# Patient Record
Sex: FEMALE | Race: WHITE | Employment: FULL TIME | URBAN - METROPOLITAN AREA
[De-identification: names, ages, dates, MRNs, and addresses within clinical notes are randomized per-mention and may not be internally consistent; named-entity substitution may affect disease eponyms.]

---

## 2017-03-09 ENCOUNTER — HOSPITAL ENCOUNTER (OUTPATIENT)
Dept: GENERAL RADIOLOGY | Age: 52
Discharge: HOME | End: 2017-03-09
Attending: FAMILY MEDICINE | Admitting: FAMILY MEDICINE

## 2017-03-09 ENCOUNTER — OFFICE VISIT (OUTPATIENT)
Dept: FAMILY MEDICINE CLINIC | Age: 52
End: 2017-03-09

## 2017-03-09 VITALS
WEIGHT: 133.6 LBS | DIASTOLIC BLOOD PRESSURE: 64 MMHG | OXYGEN SATURATION: 97 % | HEART RATE: 69 BPM | BODY MASS INDEX: 20.92 KG/M2 | SYSTOLIC BLOOD PRESSURE: 102 MMHG

## 2017-03-09 DIAGNOSIS — M77.11 EPICONDYLITIS, LATERAL, RIGHT: ICD-10-CM

## 2017-03-09 DIAGNOSIS — M77.01 EPICONDYLITIS ELBOW, MEDIAL, RIGHT: Primary | ICD-10-CM

## 2017-03-09 DIAGNOSIS — M77.01 EPICONDYLITIS ELBOW, MEDIAL, RIGHT: ICD-10-CM

## 2017-03-09 PROCEDURE — 99213 OFFICE O/P EST LOW 20 MIN: CPT | Performed by: FAMILY MEDICINE

## 2017-03-09 RX ORDER — TRIAMCINOLONE ACETONIDE 1 MG/G
CREAM TOPICAL
Refills: 1 | COMMUNITY
Start: 2016-12-22

## 2017-03-09 RX ORDER — METHYLPREDNISOLONE 4 MG/1
TABLET ORAL
Qty: 1 KIT | Refills: 0 | Status: SHIPPED | OUTPATIENT
Start: 2017-03-09 | End: 2018-04-19

## 2017-03-09 ASSESSMENT — ENCOUNTER SYMPTOMS
ABDOMINAL PAIN: 0
SHORTNESS OF BREATH: 0
SORE THROAT: 0
BACK PAIN: 0
EYE DISCHARGE: 0
COUGH: 0
SINUS PRESSURE: 0

## 2017-03-09 ASSESSMENT — PATIENT HEALTH QUESTIONNAIRE - PHQ9
1. LITTLE INTEREST OR PLEASURE IN DOING THINGS: 0
SUM OF ALL RESPONSES TO PHQ QUESTIONS 1-9: 0
SUM OF ALL RESPONSES TO PHQ9 QUESTIONS 1 & 2: 0
2. FEELING DOWN, DEPRESSED OR HOPELESS: 0

## 2017-03-13 ENCOUNTER — TELEPHONE (OUTPATIENT)
Dept: FAMILY MEDICINE CLINIC | Age: 52
End: 2017-03-13

## 2017-04-03 ENCOUNTER — TELEPHONE (OUTPATIENT)
Dept: INTERNAL MEDICINE CLINIC | Age: 52
End: 2017-04-03

## 2017-04-04 ENCOUNTER — OFFICE VISIT (OUTPATIENT)
Dept: INTERNAL MEDICINE CLINIC | Age: 52
End: 2017-04-04

## 2017-04-04 VITALS
HEIGHT: 69 IN | RESPIRATION RATE: 12 BRPM | SYSTOLIC BLOOD PRESSURE: 110 MMHG | HEART RATE: 64 BPM | DIASTOLIC BLOOD PRESSURE: 64 MMHG | WEIGHT: 132.8 LBS | BODY MASS INDEX: 19.67 KG/M2

## 2017-04-04 DIAGNOSIS — E55.9 VITAMIN D DEFICIENCY: ICD-10-CM

## 2017-04-04 DIAGNOSIS — R00.2 PALPITATIONS: ICD-10-CM

## 2017-04-04 DIAGNOSIS — E04.2 MULTINODULAR GOITER: ICD-10-CM

## 2017-04-04 DIAGNOSIS — Z00.00 ANNUAL PHYSICAL EXAM: Primary | ICD-10-CM

## 2017-04-04 LAB
HEMOCCULT STL QL: NORMAL

## 2017-04-04 PROCEDURE — 93000 ELECTROCARDIOGRAM COMPLETE: CPT | Performed by: INTERNAL MEDICINE

## 2017-04-04 PROCEDURE — 81001 URINALYSIS AUTO W/SCOPE: CPT | Performed by: INTERNAL MEDICINE

## 2017-04-04 PROCEDURE — 99396 PREV VISIT EST AGE 40-64: CPT | Performed by: INTERNAL MEDICINE

## 2017-04-04 PROCEDURE — 36415 COLL VENOUS BLD VENIPUNCTURE: CPT | Performed by: INTERNAL MEDICINE

## 2017-04-04 RX ORDER — CETIRIZINE HYDROCHLORIDE 10 MG/1
10 TABLET ORAL
COMMUNITY

## 2017-04-05 LAB
A/G RATIO: 2.3 (ref 1.1–2.2)
ALBUMIN SERPL-MCNC: 4.4 G/DL (ref 3.4–5)
ALP BLD-CCNC: 61 U/L (ref 40–129)
ALT SERPL-CCNC: 15 U/L (ref 10–40)
ANION GAP SERPL CALCULATED.3IONS-SCNC: 15 MMOL/L (ref 3–16)
AST SERPL-CCNC: 20 U/L (ref 15–37)
BASOPHILS ABSOLUTE: 0 K/UL (ref 0–0.2)
BASOPHILS RELATIVE PERCENT: 0.6 %
BILIRUB SERPL-MCNC: 0.7 MG/DL (ref 0–1)
BILIRUBIN URINE: NEGATIVE
BLOOD, URINE: NEGATIVE
BUN BLDV-MCNC: 18 MG/DL (ref 7–20)
CA 125: 10.4 U/ML (ref 0–35)
CALCIUM SERPL-MCNC: 8.9 MG/DL (ref 8.3–10.6)
CHLORIDE BLD-SCNC: 103 MMOL/L (ref 99–110)
CHOLESTEROL, TOTAL: 158 MG/DL (ref 0–199)
CLARITY: CLEAR
CO2: 25 MMOL/L (ref 21–32)
COLOR: YELLOW
CREAT SERPL-MCNC: 0.7 MG/DL (ref 0.6–1.1)
EOSINOPHILS ABSOLUTE: 0.2 K/UL (ref 0–0.6)
EOSINOPHILS RELATIVE PERCENT: 7.4 %
EPITHELIAL CELLS, UA: 0 /HPF (ref 0–5)
GFR AFRICAN AMERICAN: >60
GFR NON-AFRICAN AMERICAN: >60
GLOBULIN: 1.9 G/DL
GLUCOSE BLD-MCNC: 71 MG/DL (ref 70–99)
GLUCOSE URINE: NEGATIVE MG/DL
HCT VFR BLD CALC: 42.6 % (ref 36–48)
HDLC SERPL-MCNC: 76 MG/DL (ref 40–60)
HEMOGLOBIN: 13.8 G/DL (ref 12–16)
HYALINE CASTS: 0 /HPF (ref 0–8)
KETONES, URINE: NEGATIVE MG/DL
LDL CHOLESTEROL CALCULATED: 73 MG/DL
LEUKOCYTE ESTERASE, URINE: ABNORMAL
LYMPHOCYTES ABSOLUTE: 0.7 K/UL (ref 1–5.1)
LYMPHOCYTES RELATIVE PERCENT: 25.1 %
MCH RBC QN AUTO: 30.2 PG (ref 26–34)
MCHC RBC AUTO-ENTMCNC: 32.4 G/DL (ref 31–36)
MCV RBC AUTO: 93 FL (ref 80–100)
MICROSCOPIC EXAMINATION: YES
MONOCYTES ABSOLUTE: 0.2 K/UL (ref 0–1.3)
MONOCYTES RELATIVE PERCENT: 6.6 %
NEUTROPHILS ABSOLUTE: 1.8 K/UL (ref 1.7–7.7)
NEUTROPHILS RELATIVE PERCENT: 60.3 %
NITRITE, URINE: NEGATIVE
PDW BLD-RTO: 13 % (ref 12.4–15.4)
PH UA: 6.5
PLATELET # BLD: 127 K/UL (ref 135–450)
PMV BLD AUTO: 10.6 FL (ref 5–10.5)
POTASSIUM SERPL-SCNC: 3.9 MMOL/L (ref 3.5–5.1)
PROTEIN UA: NEGATIVE MG/DL
RBC # BLD: 4.58 M/UL (ref 4–5.2)
RBC UA: 1 /HPF (ref 0–4)
SODIUM BLD-SCNC: 143 MMOL/L (ref 136–145)
SPECIFIC GRAVITY UA: 1.01
TOTAL PROTEIN: 6.3 G/DL (ref 6.4–8.2)
TRIGL SERPL-MCNC: 43 MG/DL (ref 0–150)
TSH SERPL DL<=0.05 MIU/L-ACNC: 0.6 UIU/ML (ref 0.27–4.2)
UROBILINOGEN, URINE: 0.2 E.U./DL
VITAMIN D 25-HYDROXY: 33.8 NG/ML
VLDLC SERPL CALC-MCNC: 9 MG/DL
WBC # BLD: 2.9 K/UL (ref 4–11)
WBC UA: 0 /HPF (ref 0–5)

## 2017-04-11 ENCOUNTER — TELEPHONE (OUTPATIENT)
Dept: INTERNAL MEDICINE CLINIC | Age: 52
End: 2017-04-11

## 2017-04-17 ENCOUNTER — TELEPHONE (OUTPATIENT)
Dept: INTERNAL MEDICINE CLINIC | Age: 52
End: 2017-04-17

## 2017-04-18 ENCOUNTER — OFFICE VISIT (OUTPATIENT)
Dept: INTERNAL MEDICINE CLINIC | Age: 52
End: 2017-04-18

## 2017-04-18 DIAGNOSIS — E04.2 MULTINODULAR GOITER: ICD-10-CM

## 2017-04-18 DIAGNOSIS — Z23 NEED FOR TDAP VACCINATION: ICD-10-CM

## 2017-04-18 DIAGNOSIS — D69.6 THROMBOCYTOPENIA (HCC): Primary | ICD-10-CM

## 2017-04-18 DIAGNOSIS — J45.40 MODERATE PERSISTENT ASTHMA WITHOUT COMPLICATION: ICD-10-CM

## 2017-04-18 DIAGNOSIS — J30.2 OTHER SEASONAL ALLERGIC RHINITIS: ICD-10-CM

## 2017-04-18 LAB
BASOPHILS ABSOLUTE: 0 K/UL (ref 0–0.2)
BASOPHILS RELATIVE PERCENT: 0.1 %
EOSINOPHILS ABSOLUTE: 0.7 K/UL (ref 0–0.6)
EOSINOPHILS RELATIVE PERCENT: 12 %
HCT VFR BLD CALC: 42.6 % (ref 36–48)
HEMOGLOBIN: 14.1 G/DL (ref 12–16)
LYMPHOCYTES ABSOLUTE: 1.4 K/UL (ref 1–5.1)
LYMPHOCYTES RELATIVE PERCENT: 25.3 %
MCH RBC QN AUTO: 30.2 PG (ref 26–34)
MCHC RBC AUTO-ENTMCNC: 33.1 G/DL (ref 31–36)
MCV RBC AUTO: 91.2 FL (ref 80–100)
MONOCYTES ABSOLUTE: 0.4 K/UL (ref 0–1.3)
MONOCYTES RELATIVE PERCENT: 6.4 %
NEUTROPHILS ABSOLUTE: 3.2 K/UL (ref 1.7–7.7)
NEUTROPHILS RELATIVE PERCENT: 56.2 %
PDW BLD-RTO: 12.6 % (ref 12.4–15.4)
PLATELET # BLD: 146 K/UL (ref 135–450)
PMV BLD AUTO: 10.9 FL (ref 5–10.5)
RBC # BLD: 4.67 M/UL (ref 4–5.2)
WBC # BLD: 5.6 K/UL (ref 4–11)

## 2017-04-18 PROCEDURE — 36415 COLL VENOUS BLD VENIPUNCTURE: CPT | Performed by: INTERNAL MEDICINE

## 2017-04-18 PROCEDURE — 99213 OFFICE O/P EST LOW 20 MIN: CPT | Performed by: INTERNAL MEDICINE

## 2017-04-19 PROCEDURE — 90715 TDAP VACCINE 7 YRS/> IM: CPT | Performed by: INTERNAL MEDICINE

## 2017-04-19 PROCEDURE — 90471 IMMUNIZATION ADMIN: CPT | Performed by: INTERNAL MEDICINE

## 2017-05-02 VITALS — SYSTOLIC BLOOD PRESSURE: 106 MMHG | DIASTOLIC BLOOD PRESSURE: 66 MMHG | HEART RATE: 60 BPM | OXYGEN SATURATION: 96 %

## 2017-07-14 RX ORDER — MONTELUKAST SODIUM 10 MG/1
TABLET ORAL
Qty: 90 TABLET | Refills: 3 | Status: SHIPPED | OUTPATIENT
Start: 2017-07-14 | End: 2018-07-09 | Stop reason: SDUPTHER

## 2017-08-16 ENCOUNTER — NURSE ONLY (OUTPATIENT)
Dept: INTERNAL MEDICINE CLINIC | Age: 52
End: 2017-08-16

## 2017-08-16 ENCOUNTER — TELEPHONE (OUTPATIENT)
Dept: INTERNAL MEDICINE CLINIC | Age: 52
End: 2017-08-16

## 2017-08-16 DIAGNOSIS — Z91.09 ENVIRONMENTAL ALLERGIES: Primary | ICD-10-CM

## 2017-08-16 PROCEDURE — 96372 THER/PROPH/DIAG INJ SC/IM: CPT | Performed by: INTERNAL MEDICINE

## 2017-08-16 RX ORDER — TRIAMCINOLONE ACETONIDE 40 MG/ML
40 INJECTION, SUSPENSION INTRA-ARTICULAR; INTRAMUSCULAR ONCE
Status: COMPLETED | OUTPATIENT
Start: 2017-08-16 | End: 2017-08-16

## 2017-08-16 RX ADMIN — TRIAMCINOLONE ACETONIDE 40 MG: 40 INJECTION, SUSPENSION INTRA-ARTICULAR; INTRAMUSCULAR at 11:46

## 2018-01-04 ENCOUNTER — TELEPHONE (OUTPATIENT)
Dept: INTERNAL MEDICINE CLINIC | Age: 53
End: 2018-01-04

## 2018-04-19 ENCOUNTER — OFFICE VISIT (OUTPATIENT)
Dept: INTERNAL MEDICINE CLINIC | Age: 53
End: 2018-04-19

## 2018-04-19 VITALS
SYSTOLIC BLOOD PRESSURE: 110 MMHG | HEART RATE: 76 BPM | DIASTOLIC BLOOD PRESSURE: 60 MMHG | HEIGHT: 64 IN | BODY MASS INDEX: 22.71 KG/M2 | WEIGHT: 133 LBS

## 2018-04-19 DIAGNOSIS — E55.9 VITAMIN D DEFICIENCY: ICD-10-CM

## 2018-04-19 DIAGNOSIS — E04.2 MULTINODULAR THYROID: ICD-10-CM

## 2018-04-19 DIAGNOSIS — Z00.00 ANNUAL PHYSICAL EXAM: Primary | ICD-10-CM

## 2018-04-19 DIAGNOSIS — K57.30 SIGMOID DIVERTICULOSIS: ICD-10-CM

## 2018-04-19 DIAGNOSIS — E06.3 HASHIMOTO'S THYROIDITIS: ICD-10-CM

## 2018-04-19 LAB
A/G RATIO: 1.8 (ref 1.1–2.2)
ALBUMIN SERPL-MCNC: 4.3 G/DL (ref 3.4–5)
ALP BLD-CCNC: 68 U/L (ref 40–129)
ALT SERPL-CCNC: 14 U/L (ref 10–40)
ANION GAP SERPL CALCULATED.3IONS-SCNC: 13 MMOL/L (ref 3–16)
ANTI-THYROGLOB ABS: 11 IU/ML
AST SERPL-CCNC: 21 U/L (ref 15–37)
BASOPHILS ABSOLUTE: 0 K/UL (ref 0–0.2)
BASOPHILS RELATIVE PERCENT: 0.3 %
BILIRUB SERPL-MCNC: 0.4 MG/DL (ref 0–1)
BILIRUBIN URINE: NEGATIVE
BLOOD, URINE: NEGATIVE
BUN BLDV-MCNC: 15 MG/DL (ref 7–20)
CA 125: 12.5 U/ML (ref 0–35)
CALCIUM SERPL-MCNC: 9.6 MG/DL (ref 8.3–10.6)
CHLORIDE BLD-SCNC: 102 MMOL/L (ref 99–110)
CHOLESTEROL, TOTAL: 166 MG/DL (ref 0–199)
CLARITY: CLEAR
CO2: 28 MMOL/L (ref 21–32)
COLOR: YELLOW
CREAT SERPL-MCNC: 0.7 MG/DL (ref 0.6–1.1)
EOSINOPHILS ABSOLUTE: 0.3 K/UL (ref 0–0.6)
EOSINOPHILS RELATIVE PERCENT: 6.9 %
EPITHELIAL CELLS, UA: 0 /HPF (ref 0–5)
GFR AFRICAN AMERICAN: >60
GFR NON-AFRICAN AMERICAN: >60
GLOBULIN: 2.4 G/DL
GLUCOSE BLD-MCNC: 81 MG/DL (ref 70–99)
GLUCOSE URINE: NEGATIVE MG/DL
HCT VFR BLD CALC: 43.4 % (ref 36–48)
HDLC SERPL-MCNC: 90 MG/DL (ref 40–60)
HEMOGLOBIN: 14.8 G/DL (ref 12–16)
HYALINE CASTS: 0 /LPF (ref 0–8)
KETONES, URINE: NEGATIVE MG/DL
LDL CHOLESTEROL CALCULATED: 67 MG/DL
LEUKOCYTE ESTERASE, URINE: NEGATIVE
LYMPHOCYTES ABSOLUTE: 0.9 K/UL (ref 1–5.1)
LYMPHOCYTES RELATIVE PERCENT: 24.5 %
MCH RBC QN AUTO: 30.7 PG (ref 26–34)
MCHC RBC AUTO-ENTMCNC: 34 G/DL (ref 31–36)
MCV RBC AUTO: 90.2 FL (ref 80–100)
MICROSCOPIC EXAMINATION: NORMAL
MONOCYTES ABSOLUTE: 0.3 K/UL (ref 0–1.3)
MONOCYTES RELATIVE PERCENT: 7 %
NEUTROPHILS ABSOLUTE: 2.3 K/UL (ref 1.7–7.7)
NEUTROPHILS RELATIVE PERCENT: 61.3 %
NITRITE, URINE: NEGATIVE
PDW BLD-RTO: 12.9 % (ref 12.4–15.4)
PH UA: 6.5
PLATELET # BLD: 123 K/UL (ref 135–450)
PMV BLD AUTO: 11.3 FL (ref 5–10.5)
POTASSIUM SERPL-SCNC: 4.3 MMOL/L (ref 3.5–5.1)
PROTEIN UA: NEGATIVE MG/DL
RBC # BLD: 4.81 M/UL (ref 4–5.2)
RBC UA: 2 /HPF (ref 0–4)
SEDIMENTATION RATE, ERYTHROCYTE: 9 MM/HR (ref 0–30)
SODIUM BLD-SCNC: 143 MMOL/L (ref 136–145)
SPECIFIC GRAVITY UA: 1.01
THYROID PEROXIDASE (TPO) ABS: 10 IU/ML
TOTAL PROTEIN: 6.7 G/DL (ref 6.4–8.2)
TRIGL SERPL-MCNC: 47 MG/DL (ref 0–150)
TSH SERPL DL<=0.05 MIU/L-ACNC: 0.56 UIU/ML (ref 0.27–4.2)
UROBILINOGEN, URINE: 0.2 E.U./DL
VITAMIN D 25-HYDROXY: 45.4 NG/ML
VLDLC SERPL CALC-MCNC: 9 MG/DL
WBC # BLD: 3.7 K/UL (ref 4–11)
WBC UA: 0 /HPF (ref 0–5)

## 2018-04-19 PROCEDURE — 36415 COLL VENOUS BLD VENIPUNCTURE: CPT | Performed by: INTERNAL MEDICINE

## 2018-04-19 PROCEDURE — 81001 URINALYSIS AUTO W/SCOPE: CPT | Performed by: INTERNAL MEDICINE

## 2018-04-19 PROCEDURE — 99396 PREV VISIT EST AGE 40-64: CPT | Performed by: INTERNAL MEDICINE

## 2018-04-19 RX ORDER — FLUTICASONE PROPIONATE 50 MCG
1 SPRAY, SUSPENSION (ML) NASAL DAILY
COMMUNITY
End: 2019-05-03 | Stop reason: CLARIF

## 2018-04-19 ASSESSMENT — PATIENT HEALTH QUESTIONNAIRE - PHQ9
SUM OF ALL RESPONSES TO PHQ9 QUESTIONS 1 & 2: 0
SUM OF ALL RESPONSES TO PHQ QUESTIONS 1-9: 0
1. LITTLE INTEREST OR PLEASURE IN DOING THINGS: 0
2. FEELING DOWN, DEPRESSED OR HOPELESS: 0

## 2018-04-20 ENCOUNTER — HOSPITAL ENCOUNTER (OUTPATIENT)
Dept: ULTRASOUND IMAGING | Age: 53
Discharge: HOME | End: 2018-04-20
Attending: INTERNAL MEDICINE | Admitting: INTERNAL MEDICINE

## 2018-04-20 DIAGNOSIS — E04.2 MULTINODULAR THYROID: ICD-10-CM

## 2018-04-20 DIAGNOSIS — E04.2 NONTOXIC MULTINODULAR GOITER: ICD-10-CM

## 2018-04-20 LAB
ESTIMATED AVERAGE GLUCOSE: 99.7 MG/DL
HBA1C MFR BLD: 5.1 %

## 2018-05-02 PROBLEM — K57.30 SIGMOID DIVERTICULOSIS: Status: ACTIVE | Noted: 2018-05-02

## 2018-05-03 ENCOUNTER — OFFICE VISIT (OUTPATIENT)
Dept: INTERNAL MEDICINE CLINIC | Age: 53
End: 2018-05-03

## 2018-05-03 VITALS — DIASTOLIC BLOOD PRESSURE: 72 MMHG | SYSTOLIC BLOOD PRESSURE: 94 MMHG | RESPIRATION RATE: 16 BRPM | HEART RATE: 68 BPM

## 2018-05-03 DIAGNOSIS — E55.9 VITAMIN D DEFICIENCY: ICD-10-CM

## 2018-05-03 DIAGNOSIS — E04.2 MULTINODULAR THYROID: Primary | ICD-10-CM

## 2018-05-03 DIAGNOSIS — J45.40 MODERATE PERSISTENT ASTHMA WITHOUT COMPLICATION: ICD-10-CM

## 2018-05-03 DIAGNOSIS — D69.6 THROMBOCYTOPENIA (HCC): ICD-10-CM

## 2018-05-03 PROCEDURE — 99213 OFFICE O/P EST LOW 20 MIN: CPT | Performed by: INTERNAL MEDICINE

## 2018-05-07 ENCOUNTER — TELEPHONE (OUTPATIENT)
Dept: INTERNAL MEDICINE CLINIC | Age: 53
End: 2018-05-07

## 2018-06-05 ENCOUNTER — HOSPITAL ENCOUNTER (OUTPATIENT)
Dept: ULTRASOUND IMAGING | Age: 53
Discharge: HOME | End: 2018-06-05
Attending: OBSTETRICS & GYNECOLOGY | Admitting: OBSTETRICS & GYNECOLOGY

## 2018-06-05 DIAGNOSIS — R10.2 PELVIC AND PERINEAL PAIN: ICD-10-CM

## 2018-07-09 RX ORDER — MONTELUKAST SODIUM 10 MG/1
TABLET ORAL
Qty: 90 TABLET | Refills: 3 | Status: SHIPPED | OUTPATIENT
Start: 2018-07-09 | End: 2019-07-08 | Stop reason: SDUPTHER

## 2019-05-03 ENCOUNTER — OFFICE VISIT (OUTPATIENT)
Dept: INTERNAL MEDICINE CLINIC | Age: 54
End: 2019-05-03
Payer: COMMERCIAL

## 2019-05-03 VITALS
WEIGHT: 131 LBS | RESPIRATION RATE: 15 BRPM | HEART RATE: 68 BPM | BODY MASS INDEX: 19.85 KG/M2 | SYSTOLIC BLOOD PRESSURE: 122 MMHG | DIASTOLIC BLOOD PRESSURE: 74 MMHG | HEIGHT: 68 IN

## 2019-05-03 DIAGNOSIS — E55.9 VITAMIN D DEFICIENCY: ICD-10-CM

## 2019-05-03 DIAGNOSIS — Z23 NEED FOR PROPHYLACTIC VACCINATION AND INOCULATION AGAINST VARICELLA: ICD-10-CM

## 2019-05-03 DIAGNOSIS — Z00.00 WELLNESS EXAMINATION: Primary | ICD-10-CM

## 2019-05-03 PROCEDURE — 99396 PREV VISIT EST AGE 40-64: CPT | Performed by: INTERNAL MEDICINE

## 2019-05-03 ASSESSMENT — PATIENT HEALTH QUESTIONNAIRE - PHQ9
SUM OF ALL RESPONSES TO PHQ QUESTIONS 1-9: 0
SUM OF ALL RESPONSES TO PHQ9 QUESTIONS 1 & 2: 0
2. FEELING DOWN, DEPRESSED OR HOPELESS: 0
1. LITTLE INTEREST OR PLEASURE IN DOING THINGS: 0
SUM OF ALL RESPONSES TO PHQ QUESTIONS 1-9: 0

## 2019-05-03 NOTE — PATIENT INSTRUCTIONS
[employer]  Omega 3 Fish Oil 1200 mg one every other day    Hepatitis AB immunization at 0-1-6 months or Hepatitis A at 0 & 6 months

## 2019-05-03 NOTE — PROGRESS NOTES
Annual Exam      PI:         The patient is a 48year old white female who presents for her wellness exam. Doing well and using her medications as directed and without side effects. No fever or chills. No chest pain. She has been doing her normal activities without limitations or symptoms. She has a problem of asthma on Pulmicort Flexhaler and Singulair with good results. Recently her insurance required a change to Qvar inhaler with poor results. She sees Dr Camilla Galdamez for her pelvic and pap. She recently completed a normal exam and had a normal mammogram at [facility] 2 months ago. She has a history of multinodular thyroid. Left thyroid cystic lesion aspirated by Dr Lm Umana in 5/2010 and nondiagnostic. Her last thyroid US was on July 2016 showing stable cystic lesions. Thyroid ultrasound in 2017 was not completed. Her thyroid US in April 2018 revealed a multinodular thyroid with no change from 2016. She had a 2nd opinion at [facility] and underwent a FNA on 7/2/18 of a small right thyroid nodule by Dr Julissa Rosado revealing no malignant cells with benign follicular cells and abundant colloid. No neck pain, voice change or swallowing difficulties. Her Brother was treated for thyroid cancer. She completed a colonoscopy in Feb 2018 at [facility]. This revealed sigmoid diverticulosis and 3 polyps <10 mm that were all tubular adenomas. A three year follow up was recommended. No abdominal pain, change in bowel habits, hematochezia, or melanotic stools. PMH: Problems of asthma, allergic rhinitis, multinodular goiter, sigmoid diverticulosis, and adenomatous colonic polyps. No history of diabetes or cardiac disorders. Surgical history of sinus surgery X 2. MED:   Current Outpatient Medications   Medication Sig Dispense Refill    zoster recombinant adjuvanted vaccine (SHINGRIX) 50 MCG/0.5ML SUSR injection Inject 0.5 mLs into the muscle once for 1 dose 50 MCG IM then repeat 2-6 months. 1 each 1    montelukast (SINGULAIR) 10 MG tablet TAKE 1 TABLET DAILY 90 tablet 3    budesonide (PULMICORT FLEXHALER) 180 MCG/ACT AEPB inhaler Inhale 1 puff into the lungs 2 times daily 3 each 3    fluticasone (FLONASE) 50 MCG/ACT nasal spray 1 spray by Nasal route daily      cetirizine (ZYRTEC) 10 MG tablet Take 10 mg by mouth      triamcinolone (KENALOG) 0.1 % cream APPLY TO AFFECTED AREA ON TORSO AND EXTREMITIES DAILY TO TWICE DAILY FOR 2 WEEKS  1    albuterol sulfate HFA (PROVENTIL HFA) 108 (90 BASE) MCG/ACT inhaler INHALE 1 PUFFS INTO THE LUNGS EVERY 4 HRS AS NEEDED FOR BREATHING 1 Inhaler 3    ferrous sulfate 325 (65 FE) MG tablet Take 325 mg by mouth daily (with breakfast).  calcium-vitamin D (OSCAL-500) 500-200 MG-UNIT per tablet Take 1 tablet by mouth daily.  Multiple Vitamins-Minerals (MULTIVITAMIN & MINERAL PO) Take 1 tablet by mouth daily.  vitamin D (CHOLECALCIFEROL) 400 UNIT TABS tablet Take 800 Units by mouth daily. No current facility-administered medications for this visit. ALL: Sulfa causes a rash. ASA and Motrin caused angioedema. Intolerant to erythromycin. SH:   No smoking or alcohol history. One coffee a day. FH:   Mother in her 76s with hyperlipidemia. Father in his 76s with ASHD and hyperlipidemia. ROS:   Heent:         No headaches, voice change, hoarseness, or swallowing difficulties. Resp:          No Wheezing, coughing, shortness of breath, or hemoptysis. CV:              No rest or exertional chest pain. No palpitations, dizziness, or syncope. GI:               No abdominal pain, change in bowel habits, hematochezia, or melanotic stools. :             No frequency, urgency, or hematuria. Neuro:         No symptoms of cranial nerve dysfunction, gait difficulties, or extremities weakness. Immun:        Reports up to date on influenza, TDap, pneumovax-23.

## 2019-06-17 ENCOUNTER — OFFICE VISIT (OUTPATIENT)
Dept: INTERNAL MEDICINE CLINIC | Age: 54
End: 2019-06-17
Payer: COMMERCIAL

## 2019-06-17 ENCOUNTER — TELEPHONE (OUTPATIENT)
Dept: INTERNAL MEDICINE CLINIC | Age: 54
End: 2019-06-17

## 2019-06-17 VITALS
DIASTOLIC BLOOD PRESSURE: 76 MMHG | WEIGHT: 129.8 LBS | HEART RATE: 65 BPM | BODY MASS INDEX: 20.03 KG/M2 | SYSTOLIC BLOOD PRESSURE: 118 MMHG | OXYGEN SATURATION: 99 % | RESPIRATION RATE: 14 BRPM

## 2019-06-17 DIAGNOSIS — J45.40 MODERATE PERSISTENT ASTHMA WITHOUT COMPLICATION: Primary | ICD-10-CM

## 2019-06-17 DIAGNOSIS — J30.2 SEASONAL ALLERGIC RHINITIS, UNSPECIFIED TRIGGER: ICD-10-CM

## 2019-06-17 PROCEDURE — 96372 THER/PROPH/DIAG INJ SC/IM: CPT | Performed by: INTERNAL MEDICINE

## 2019-06-17 PROCEDURE — 99213 OFFICE O/P EST LOW 20 MIN: CPT | Performed by: INTERNAL MEDICINE

## 2019-06-17 RX ORDER — TRIAMCINOLONE ACETONIDE 40 MG/ML
40 INJECTION, SUSPENSION INTRA-ARTICULAR; INTRAMUSCULAR ONCE
Status: COMPLETED | OUTPATIENT
Start: 2019-06-17 | End: 2019-06-17

## 2019-06-17 RX ADMIN — TRIAMCINOLONE ACETONIDE 40 MG: 40 INJECTION, SUSPENSION INTRA-ARTICULAR; INTRAMUSCULAR at 17:04

## 2019-06-24 NOTE — PROGRESS NOTES
S: Patient presents for followup on asthma , allergic rhinitis, Vit D deficiency, and multinodular goiter. She has been using her inhalers and oral medications as directed. She has had mild asthma symptoms and quite a bit of allergic rhinitis symptoms. No fever or chills. No headache, chest pain, or breathing difficulties. No GI symptoms. O:Blood pressure 118/76, pulse 65, resp. rate 14, weight 129 lb 12.8 oz (58.9 kg), SpO2 99 %.       HEENT: TMs are clear, rhinitis present with post nasal drainage      Neck: thyroid with diffuse nodularity, no lymphadenopathy      Lungs: Diffuse decreased BS, no wheezing      Cardio: reg pulse      Ext: no edema    A: moderate asthma exacerbation      Allergic rhinitis exacerbation    P: continue present medications      Kenalog 40 1 cc IM injection given      Trial of Spiriva Respimat 1.25 mcg 2 puffs a day, sample given      Call results and return as scheduled

## 2019-07-08 RX ORDER — MONTELUKAST SODIUM 10 MG/1
TABLET ORAL
Qty: 90 TABLET | Refills: 3 | Status: SHIPPED | OUTPATIENT
Start: 2019-07-08 | End: 2020-08-11 | Stop reason: SDUPTHER

## 2019-09-25 ENCOUNTER — OFFICE VISIT (OUTPATIENT)
Dept: INTERNAL MEDICINE CLINIC | Age: 54
End: 2019-09-25
Payer: COMMERCIAL

## 2019-09-25 VITALS
BODY MASS INDEX: 19.72 KG/M2 | HEART RATE: 64 BPM | WEIGHT: 127.8 LBS | SYSTOLIC BLOOD PRESSURE: 120 MMHG | DIASTOLIC BLOOD PRESSURE: 70 MMHG

## 2019-09-25 DIAGNOSIS — D25.9 UTERINE LEIOMYOMA, UNSPECIFIED LOCATION: ICD-10-CM

## 2019-09-25 DIAGNOSIS — D12.6 ADENOMATOUS POLYP OF COLON, UNSPECIFIED PART OF COLON: ICD-10-CM

## 2019-09-25 DIAGNOSIS — J45.40 MODERATE PERSISTENT ASTHMA WITHOUT COMPLICATION: Primary | ICD-10-CM

## 2019-09-25 DIAGNOSIS — K57.30 SIGMOID DIVERTICULOSIS: ICD-10-CM

## 2019-09-25 PROCEDURE — 99213 OFFICE O/P EST LOW 20 MIN: CPT | Performed by: INTERNAL MEDICINE

## 2019-12-05 RX ORDER — ALBUTEROL SULFATE 90 UG/1
AEROSOL, METERED RESPIRATORY (INHALATION)
Qty: 1 INHALER | Refills: 3 | Status: SHIPPED | OUTPATIENT
Start: 2019-12-05 | End: 2020-04-08 | Stop reason: SDUPTHER

## 2020-02-10 ENCOUNTER — OFFICE VISIT (OUTPATIENT)
Dept: INTERNAL MEDICINE CLINIC | Age: 55
End: 2020-02-10
Payer: COMMERCIAL

## 2020-02-10 VITALS
BODY MASS INDEX: 20.06 KG/M2 | HEART RATE: 60 BPM | RESPIRATION RATE: 15 BRPM | WEIGHT: 130 LBS | DIASTOLIC BLOOD PRESSURE: 74 MMHG | SYSTOLIC BLOOD PRESSURE: 124 MMHG

## 2020-02-10 PROBLEM — R53.83 FATIGUE: Status: ACTIVE | Noted: 2020-02-10

## 2020-02-10 PROCEDURE — 99213 OFFICE O/P EST LOW 20 MIN: CPT | Performed by: FAMILY MEDICINE

## 2020-02-10 ASSESSMENT — PATIENT HEALTH QUESTIONNAIRE - PHQ9
SUM OF ALL RESPONSES TO PHQ9 QUESTIONS 1 & 2: 0
2. FEELING DOWN, DEPRESSED OR HOPELESS: 0
SUM OF ALL RESPONSES TO PHQ QUESTIONS 1-9: 0
SUM OF ALL RESPONSES TO PHQ QUESTIONS 1-9: 0
1. LITTLE INTEREST OR PLEASURE IN DOING THINGS: 0

## 2020-02-10 ASSESSMENT — ENCOUNTER SYMPTOMS
COLOR CHANGE: 0
CONSTIPATION: 0
SORE THROAT: 0
ABDOMINAL PAIN: 0
DIARRHEA: 0
VOMITING: 0
SHORTNESS OF BREATH: 0
CHEST TIGHTNESS: 0

## 2020-02-10 NOTE — PROGRESS NOTES
Subjective:      Chief Complaint   Patient presents with   Sandra Heller Doctor     former Dr Hermelindo Hall patient       HPI:  Dread Hollins is a 48 y.o. female who presents today to establish care. Has history of asthma and allergic rhinitis. Is currently taking pulmicort with albuterol as needed. States she only needs albuterol inhaler about once a month. Has been on Advair and Flovent in the past but gave her issues with her vocal cords (dystonia). Feels medications are keeping asthma adequately controlled. However, feels like she is constantly [place] little sick. \"  Does not stop her from accomplishing tasks or going to work, but just never feels 100%. Feels tired with nasal congestion at baseline. Takes zyrtec with OTC nasonex for allergies. Saw an allergist over 10 years ago and used to get allergy shots, but could not continue (due to schedule). Has also seen pulmonologists (most recently about 2 years ago at [facility]) but is not currently seeing anyone. Saw a commercial for fasenra and was wondering if she could benefit as she knows her eosinophils have been elevated. [place]. No other issues today. Past Medical History:   Diagnosis Date    Allergic rhinitis     Asthma     Multiple thyroid nodules     Nasal polyps         Past Surgical History:   Procedure Laterality Date    COLONOSCOPY  02/12/2018    3 polyps, diverticulosis-[facility] recommended repeat in 3 to 5 yrs    HYSTERECTOMY, TOTAL ABDOMINAL  12/10/2019    SINUS SURGERY      x2    WISDOM TOOTH EXTRACTION         Social History     Tobacco Use    Smoking status: Never Smoker    Smokeless tobacco: Never Used   Substance Use Topics    Alcohol use: No     Comment: avoids caffeine        Review of Systems   Constitutional: Positive for fatigue. Negative for activity change, appetite change, chills, fever and unexpected weight change. HENT: Positive for congestion. Negative for sore throat.     Respiratory: Negative for chest

## 2020-04-08 RX ORDER — ALBUTEROL SULFATE 90 UG/1
AEROSOL, METERED RESPIRATORY (INHALATION)
Qty: 1 INHALER | Refills: 3 | Status: SHIPPED | OUTPATIENT
Start: 2020-04-08

## 2020-06-08 ENCOUNTER — TELEPHONE (OUTPATIENT)
Dept: INTERNAL MEDICINE CLINIC | Age: 55
End: 2020-06-08

## 2020-06-08 RX ORDER — CYCLOBENZAPRINE HCL 5 MG
TABLET ORAL
Qty: 15 TABLET | Refills: 0 | Status: SHIPPED | OUTPATIENT
Start: 2020-06-08 | End: 2020-07-07

## 2020-06-08 NOTE — TELEPHONE ENCOUNTER
Spoke with patient. No injury or traumat- states that she woke up about 1.5 weeks ago and had pain from L buttock down through L leg associated with low back pain. Is still able to work and accomplish her daily activities but symptoms are worst when she has to change positions (go from sitting to standing, etc). Has been using heat/ice, cannot tolerate NSAIDs. States she has had these episodes in the past but has not required medications because they usually resolve within a few days. Only has episodes once every few years. Likely sciatica, will try short course of muscle relaxants. Continue heat/ice, tylenol as needed, advised stretching and activity as tolerated. Call clinic if symptoms still have not improved by the end of the week, can try prednisone at that time as requested. If symptoms continue to persist for a few weeks, would advise PT. Patient voiced understanding.

## 2020-07-03 LAB
BASOPHILS ABSOLUTE: 0 /ΜL
BASOPHILS RELATIVE PERCENT: 0 %
BUN BLDV-MCNC: 16 MG/DL
CALCIUM SERPL-MCNC: 9.6 MG/DL
CHLORIDE BLD-SCNC: 101 MMOL/L
CHOLESTEROL, TOTAL: 163 MG/DL
CHOLESTEROL/HDL RATIO: ABNORMAL
CO2: 26 MMOL/L
CREAT SERPL-MCNC: 0.91 MG/DL
EOSINOPHILS ABSOLUTE: 0.2 /ΜL
EOSINOPHILS RELATIVE PERCENT: 6 %
GFR CALCULATED: NORMAL
GLUCOSE BLD-MCNC: 109 MG/DL
HCT VFR BLD CALC: 43.2 % (ref 36–46)
HDLC SERPL-MCNC: 76 MG/DL (ref 35–70)
HEMOGLOBIN: 14.1 G/DL (ref 12–16)
LDL CHOLESTEROL CALCULATED: 75 MG/DL (ref 0–160)
LYMPHOCYTES ABSOLUTE: 0.9 /ΜL
LYMPHOCYTES RELATIVE PERCENT: 29 %
MCH RBC QN AUTO: 29.5 PG
MCHC RBC AUTO-ENTMCNC: 32.6 G/DL
MCV RBC AUTO: 90 FL
MONOCYTES ABSOLUTE: 0.2 /ΜL
MONOCYTES RELATIVE PERCENT: 7 %
NEUTROPHILS ABSOLUTE: 1.9 /ΜL
NEUTROPHILS RELATIVE PERCENT: 58 %
PDW BLD-RTO: 11.6 %
PLATELET # BLD: 134 K/ΜL
PMV BLD AUTO: NORMAL FL
POTASSIUM SERPL-SCNC: 4 MMOL/L
RBC # BLD: 4.78 10^6/ΜL
SODIUM BLD-SCNC: 142 MMOL/L
TRIGL SERPL-MCNC: 58 MG/DL
TSH SERPL DL<=0.05 MIU/L-ACNC: 0.94 UIU/ML
VLDLC SERPL CALC-MCNC: 12 MG/DL
WBC # BLD: 3.2 10^3/ML

## 2020-07-07 ENCOUNTER — OFFICE VISIT (OUTPATIENT)
Dept: INTERNAL MEDICINE CLINIC | Age: 55
End: 2020-07-07
Payer: COMMERCIAL

## 2020-07-07 VITALS
OXYGEN SATURATION: 96 % | TEMPERATURE: 97.4 F | RESPIRATION RATE: 28 BRPM | BODY MASS INDEX: 19.94 KG/M2 | HEIGHT: 68 IN | WEIGHT: 131.6 LBS | HEART RATE: 79 BPM | DIASTOLIC BLOOD PRESSURE: 60 MMHG | SYSTOLIC BLOOD PRESSURE: 100 MMHG

## 2020-07-07 PROCEDURE — 99214 OFFICE O/P EST MOD 30 MIN: CPT | Performed by: FAMILY MEDICINE

## 2020-07-07 ASSESSMENT — ENCOUNTER SYMPTOMS
SHORTNESS OF BREATH: 0
ABDOMINAL PAIN: 0
VOMITING: 0
CHEST TIGHTNESS: 0
DIARRHEA: 0
COLOR CHANGE: 0
CONSTIPATION: 0
SORE THROAT: 0

## 2020-07-07 NOTE — PROGRESS NOTES
Subjective:      Chief Complaint   Patient presents with    Annual Exam       HPI:  Manuelito Ordoñez is a 48 y.o. female who presents today for follow up chronic conditions as listed below. Allergy/asthma symptoms have been well controlled. Was planning on seeing pulmonology after her last appointment (to discuss potential new medication), but did not end up doing so. Wants to hold off for now as her symptoms have been adequately controlled. Is due for colonoscopy in Dec 2020- had it completed in [place], had a few polyps and was told to repeat in 3 years. Does not need a referral.     States she is being followed by specialist in [place] for history of thyroid nodules- denies any symptoms. Has never needed to be on medication. Feeling well today, no acute concerns. Labs reviewed. Past Medical History:   Diagnosis Date    Allergic rhinitis     Asthma     Multiple thyroid nodules     Nasal polyps         Past Surgical History:   Procedure Laterality Date    COLONOSCOPY  02/12/2018    3 polyps, diverticulosis-[facility] recommended repeat in 3 to 5 yrs    HYSTERECTOMY, TOTAL ABDOMINAL  12/10/2019    SINUS SURGERY      x2    WISDOM TOOTH EXTRACTION         Social History     Tobacco Use    Smoking status: Never Smoker    Smokeless tobacco: Never Used   Substance Use Topics    Alcohol use: No     Comment: avoids caffeine        Review of Systems   Constitutional: Negative for activity change, appetite change, chills, fever and unexpected weight change. HENT: Negative for congestion and sore throat. Respiratory: Negative for chest tightness and shortness of breath. Cardiovascular: Negative for chest pain and palpitations. Gastrointestinal: Negative for abdominal pain, constipation, diarrhea and vomiting. Genitourinary: Negative for dysuria. Skin: Negative for color change. Neurological: Negative for dizziness and light-headedness.    Psychiatric/Behavioral: Negative for dysphoric mood. The patient is not nervous/anxious. Prior to Visit Medications    Medication Sig Taking? Authorizing Provider   albuterol sulfate HFA (PROVENTIL HFA) 108 (90 Base) MCG/ACT inhaler INHALE 1 PUFFS INTO THE LUNGS EVERY 4 HRS AS NEEDED FOR BREATHING Yes Colonel Nahomy MD   PU[place]YN FLEXHALER 180 MCG/ACT AEPB inhaler TAKE 1 PUFF BY MOUTH TWICE A DAY *NEEDS AP* Yes Joey Montoya MD   montelukast (SINGULAIR) 10 MG tablet TAKE 1 TABLET DAILY Yes Joey Montoya MD   cetirizine (ZYRTEC) 10 MG tablet Take 10 mg by mouth Yes Historical Provider, MD   triamcinolone (KENALOG) 0.1 % cream APPLY TO AFFECTED AREA ON TORSO AND EXTREMITIES DAILY TO TWICE DAILY FOR 2 WEEKS Yes Historical Provider, MD   calcium-vitamin D (OSCAL-500) 500-200 MG-UNIT per tablet Take 1 tablet by mouth daily. Yes Historical Provider, MD   Multiple Vitamins-Minerals (MULTIVITAMIN & MINERAL PO) Take 1 tablet by mouth daily. Yes Historical Provider, MD   vitamin D (CHOLECALCIFEROL) 400 UNIT TABS tablet Take 800 Units by mouth daily. Yes Historical Provider, MD          Objective:      /60   Pulse 79   Temp 97.4 °F (36.3 °C)   Resp 28   Ht 5' 7.5\" (1.715 m)   Wt 131 lb 9.6 oz (59.7 kg)   LMP 09/01/2014   SpO2 96%   Breastfeeding No   BMI 20.31 kg/m²      Physical Exam  Vitals signs and nursing note reviewed. Constitutional:       General: She is not in acute distress. Appearance: Normal appearance. She is not ill-appearing or toxic-appearing. HENT:      Head: Normocephalic and atraumatic. Right Ear: External ear normal.      Left Ear: External ear normal.      Nose: Nose normal.      Mouth/Throat:      Pharynx: Oropharynx is clear. Eyes:      General: No scleral icterus. Right eye: No discharge. Left eye: No discharge. Extraocular Movements: Extraocular movements intact.       Conjunctiva/sclera: Conjunctivae normal.   Neck:      Musculoskeletal: Normal range of motion and neck supple. No neck rigidity. Cardiovascular:      Rate and Rhythm: Normal rate and regular rhythm. Heart sounds: Normal heart sounds. Pulmonary:      Effort: Pulmonary effort is normal.      Breath sounds: Normal breath sounds. No wheezing or rales. Musculoskeletal:         General: No deformity. Skin:     General: Skin is warm and dry. Findings: No rash. Neurological:      General: No focal deficit present. Mental Status: She is alert. Mental status is at baseline. Motor: No weakness. Psychiatric:         Mood and Affect: Mood normal.         Behavior: Behavior normal.            Assessment / Plan:      1. Mild persistent asthma without complication  Symptoms well controlled, continue current regimen. 2. Screening for breast cancer  - [facility] Screening Bilateral    3. General medical exam  Due for mammogram; otherwise up to date on screening exams.   - Basic Metabolic Panel; Future  - CBC Auto Differential; Future  - Lipid Panel; Future    4. Multinodular goiter  Asymptomatic. Last TSH wnl. Being followed by endocrinologist in [place].  - TSH without Reflex; Future          Patient voiced understanding and agreement with plan. All questions/concerns were addressed, risks/side effects of medications were reviewed. Return precautions and after visit summary were provided. Return in about 1 year (around 7/7/2021). or earlier as needed.       Mouna Osborn MD

## 2020-08-11 RX ORDER — MONTELUKAST SODIUM 10 MG/1
TABLET ORAL
Qty: 90 TABLET | Refills: 3 | Status: SHIPPED | OUTPATIENT
Start: 2020-08-11 | End: 2020-08-13 | Stop reason: SDUPTHER

## 2020-08-13 RX ORDER — MONTELUKAST SODIUM 10 MG/1
TABLET ORAL
Qty: 90 TABLET | Refills: 3 | Status: SHIPPED | OUTPATIENT
Start: 2020-08-13

## 2020-09-11 ENCOUNTER — TELEPHONE (OUTPATIENT)
Dept: INTERNAL MEDICINE CLINIC | Age: 55
End: 2020-09-11

## 2020-09-16 NOTE — TELEPHONE ENCOUNTER
FMLA form has been received. Per her accompanying note, \"I will be working for the next 12 weeks on a divorce and need mental time off work. \"      Please notify patient that unfortunately, this does not qualify for FMLA- per the Medical Leave Act, this requires the patient to have a \"serious health condition\" that requires inpatient care/continuing treatment by a health care provider. If the patient feels her symptoms are severe enough that she needs to see a psychiatrist or counselor, I am happy to place a referral (and if she is needing regular appointments with that specialist, I can fill out the paperwork so that she can attend those appointments). However, short of that, I would not be able to complete this paperwork. Would advise taking a medical leave of absence if she feels she needs time off for her mental health.

## 2020-11-24 ENCOUNTER — HOSPITAL ENCOUNTER (OUTPATIENT)
Dept: WOMENS IMAGING | Age: 55
Discharge: HOME | End: 2020-11-24
Payer: COMMERCIAL

## 2020-11-24 PROCEDURE — 77063 BREAST TOMOSYNTHESIS BI: CPT

## 2020-12-09 ENCOUNTER — HOSPITAL ENCOUNTER (OUTPATIENT)
Dept: WOMENS IMAGING | Age: 55
Discharge: HOME | End: 2020-12-09
Payer: COMMERCIAL

## 2020-12-09 PROCEDURE — G0279 TOMOSYNTHESIS, MAMMO: HCPCS

## 2022-03-24 ENCOUNTER — HOSPITAL ENCOUNTER (OUTPATIENT)
Dept: MAMMOGRAPHY | Age: 57
Discharge: HOME | End: 2022-03-24
Payer: COMMERCIAL

## 2022-03-24 DIAGNOSIS — Z12.31 SCREENING MAMMOGRAM, ENCOUNTER FOR: ICD-10-CM

## 2022-03-24 PROCEDURE — 77063 BREAST TOMOSYNTHESIS BI: CPT

## 2023-03-28 ENCOUNTER — HOSPITAL ENCOUNTER (OUTPATIENT)
Dept: MAMMOGRAPHY | Age: 58
Discharge: HOME | End: 2023-03-28
Payer: COMMERCIAL

## 2023-03-28 DIAGNOSIS — Z12.31 SCREENING MAMMOGRAM, ENCOUNTER FOR: ICD-10-CM

## 2023-03-28 PROCEDURE — 77063 BREAST TOMOSYNTHESIS BI: CPT

## 2024-03-21 ENCOUNTER — HOSPITAL ENCOUNTER (OUTPATIENT)
Dept: MAMMOGRAPHY | Age: 59
Discharge: HOME | End: 2024-03-21
Payer: COMMERCIAL

## 2024-03-21 VITALS — BODY MASS INDEX: 20.4 KG/M2 | HEIGHT: 67 IN | WEIGHT: 130 LBS

## 2024-03-21 DIAGNOSIS — Z12.31 SCREENING MAMMOGRAM FOR BREAST CANCER: ICD-10-CM

## 2024-03-21 PROCEDURE — 77063 BREAST TOMOSYNTHESIS BI: CPT

## 2024-04-03 DIAGNOSIS — R20.2 PARESTHESIA AND PAIN OF RIGHT EXTREMITY: Primary | ICD-10-CM

## 2024-04-03 DIAGNOSIS — M79.609 PARESTHESIA AND PAIN OF RIGHT EXTREMITY: Primary | ICD-10-CM

## 2024-07-22 ENCOUNTER — PROCEDURE VISIT (OUTPATIENT)
Dept: NEUROLOGY | Age: 59
End: 2024-07-22
Payer: COMMERCIAL

## 2024-07-22 DIAGNOSIS — G57.11 MERALGIA PARESTHETICA, RIGHT LOWER LIMB: Primary | ICD-10-CM

## 2024-07-22 PROCEDURE — 95886 MUSC TEST DONE W/N TEST COMP: CPT | Performed by: PHYSICAL MEDICINE & REHABILITATION

## 2024-07-22 PROCEDURE — 95909 NRV CNDJ TST 5-6 STUDIES: CPT | Performed by: PHYSICAL MEDICINE & REHABILITATION

## 2024-07-22 NOTE — PROGRESS NOTES
EMG    Risks and benefits of study discussed.    Specific and common risks of pain and bleeding as well as uncommon side effects of infection, hematoma and vasovagal episodes    Patient agreeable to testing and consents to such.    Clinical: 5 months of lateral thigh paresthesias.  No weakness of the knee.  Localized mechanical back pain stiffness and soreness for several decades, but without any radiation and no recent changes.    Motor NCS:  Tibial and peroneal amplitudes, latencies and velocities normal on the right    Sensory NCS:  Sural and peroneal amplitudes and latencies normal on the right  Medial and lateral plantar responses normal on the right    Needle EMG: Normal findings throughout the right lower limb.    Impression:  #1 normal study of the right lower limb, without evidence of lumbar radiculopathy, plexopathy, mononeuropathy (such as femoral), or generalized peripheral neuropathy.  #2.  Clinical symptoms are strongly suggestive of meralgia paresthetica involving the lateral femoral cutaneous nerve.  This diagnosis is made most reliably with clinical data, in the setting of normal EMG findings.

## 2025-03-25 ENCOUNTER — HOSPITAL ENCOUNTER (OUTPATIENT)
Dept: MAMMOGRAPHY | Age: 60
Discharge: HOME | End: 2025-03-25
Payer: COMMERCIAL

## 2025-03-25 DIAGNOSIS — Z12.31 SCREENING MAMMOGRAM, ENCOUNTER FOR: ICD-10-CM

## 2025-03-25 PROCEDURE — 77063 BREAST TOMOSYNTHESIS BI: CPT

## 2025-04-11 ENCOUNTER — HOSPITAL ENCOUNTER (OUTPATIENT)
Dept: ULTRASOUND IMAGING | Age: 60
Discharge: HOME | End: 2025-04-11
Payer: COMMERCIAL

## 2025-04-11 ENCOUNTER — HOSPITAL ENCOUNTER (OUTPATIENT)
Dept: WOMENS IMAGING | Age: 60
Discharge: HOME | End: 2025-04-11
Payer: COMMERCIAL

## 2025-04-11 DIAGNOSIS — R92.8 ABNORMAL MAMMOGRAM: ICD-10-CM

## 2025-04-11 DIAGNOSIS — N64.89 BREAST ASYMMETRY IN FEMALE: ICD-10-CM

## 2025-04-11 PROCEDURE — 76642 ULTRASOUND BREAST LIMITED: CPT

## 2025-04-11 PROCEDURE — G0279 TOMOSYNTHESIS, MAMMO: HCPCS
